# Patient Record
Sex: FEMALE | Race: WHITE | Employment: FULL TIME | URBAN - METROPOLITAN AREA
[De-identification: names, ages, dates, MRNs, and addresses within clinical notes are randomized per-mention and may not be internally consistent; named-entity substitution may affect disease eponyms.]

---

## 2020-07-08 ENCOUNTER — HOSPITAL ENCOUNTER (EMERGENCY)
Facility: CLINIC | Age: 25
Discharge: HOME | End: 2020-07-08
Attending: EMERGENCY MEDICINE | Admitting: EMERGENCY MEDICINE
Payer: COMMERCIAL

## 2020-07-08 ENCOUNTER — APPOINTMENT (OUTPATIENT)
Dept: CT IMAGING | Facility: CLINIC | Age: 25
End: 2020-07-08
Attending: EMERGENCY MEDICINE
Payer: COMMERCIAL

## 2020-07-08 VITALS
WEIGHT: 125 LBS | RESPIRATION RATE: 14 BRPM | HEART RATE: 75 BPM | OXYGEN SATURATION: 97 % | HEIGHT: 67 IN | BODY MASS INDEX: 19.62 KG/M2 | TEMPERATURE: 98.2 F | DIASTOLIC BLOOD PRESSURE: 81 MMHG | SYSTOLIC BLOOD PRESSURE: 122 MMHG

## 2020-07-08 DIAGNOSIS — Z78.9 ALCOHOL USE: ICD-10-CM

## 2020-07-08 DIAGNOSIS — Z32.01 PREGNANCY TEST POSITIVE: ICD-10-CM

## 2020-07-08 DIAGNOSIS — R56.9 SEIZURE (H): ICD-10-CM

## 2020-07-08 LAB
ALBUMIN SERPL-MCNC: 4.1 G/DL (ref 3.4–5)
ALP SERPL-CCNC: 68 U/L (ref 40–150)
ALT SERPL W P-5'-P-CCNC: 30 U/L (ref 0–50)
ANION GAP SERPL CALCULATED.3IONS-SCNC: 8 MMOL/L (ref 3–14)
AST SERPL W P-5'-P-CCNC: 25 U/L (ref 0–45)
B-HCG SERPL-ACNC: 30 IU/L (ref 0–5)
BASOPHILS # BLD AUTO: 0 10E9/L (ref 0–0.2)
BASOPHILS NFR BLD AUTO: 0.1 %
BILIRUB DIRECT SERPL-MCNC: 0.3 MG/DL (ref 0–0.2)
BILIRUB SERPL-MCNC: 1 MG/DL (ref 0.2–1.3)
BUN SERPL-MCNC: 12 MG/DL (ref 7–30)
CALCIUM SERPL-MCNC: 9.1 MG/DL (ref 8.5–10.1)
CHLORIDE SERPL-SCNC: 105 MMOL/L (ref 94–109)
CO2 SERPL-SCNC: 22 MMOL/L (ref 20–32)
CREAT SERPL-MCNC: 0.74 MG/DL (ref 0.52–1.04)
DIFFERENTIAL METHOD BLD: ABNORMAL
EOSINOPHIL # BLD AUTO: 0 10E9/L (ref 0–0.7)
EOSINOPHIL NFR BLD AUTO: 0.4 %
ERYTHROCYTE [DISTWIDTH] IN BLOOD BY AUTOMATED COUNT: 12.9 % (ref 10–15)
ETHANOL SERPL-MCNC: <0.01 G/DL
GFR SERPL CREATININE-BSD FRML MDRD: >90 ML/MIN/{1.73_M2}
GLUCOSE SERPL-MCNC: 79 MG/DL (ref 70–99)
HCG SERPL QL: POSITIVE
HCT VFR BLD AUTO: 41.4 % (ref 35–47)
HGB BLD-MCNC: 14.2 G/DL (ref 11.7–15.7)
IMM GRANULOCYTES # BLD: 0 10E9/L (ref 0–0.4)
IMM GRANULOCYTES NFR BLD: 0.1 %
INTERPRETATION ECG - MUSE: NORMAL
LIPASE SERPL-CCNC: 101 U/L (ref 73–393)
LYMPHOCYTES # BLD AUTO: 1.2 10E9/L (ref 0.8–5.3)
LYMPHOCYTES NFR BLD AUTO: 16.6 %
MAGNESIUM SERPL-MCNC: 2.1 MG/DL (ref 1.6–2.3)
MCH RBC QN AUTO: 33.3 PG (ref 26.5–33)
MCHC RBC AUTO-ENTMCNC: 34.3 G/DL (ref 31.5–36.5)
MCV RBC AUTO: 97 FL (ref 78–100)
MONOCYTES # BLD AUTO: 0.5 10E9/L (ref 0–1.3)
MONOCYTES NFR BLD AUTO: 7.2 %
NEUTROPHILS # BLD AUTO: 5.4 10E9/L (ref 1.6–8.3)
NEUTROPHILS NFR BLD AUTO: 75.6 %
NRBC # BLD AUTO: 0 10*3/UL
NRBC BLD AUTO-RTO: 0 /100
PHOSPHATE SERPL-MCNC: 1.6 MG/DL (ref 2.5–4.5)
PLATELET # BLD AUTO: 171 10E9/L (ref 150–450)
POTASSIUM SERPL-SCNC: 3.8 MMOL/L (ref 3.4–5.3)
PROT SERPL-MCNC: 7.2 G/DL (ref 6.8–8.8)
RBC # BLD AUTO: 4.27 10E12/L (ref 3.8–5.2)
SODIUM SERPL-SCNC: 135 MMOL/L (ref 133–144)
WBC # BLD AUTO: 7.1 10E9/L (ref 4–11)

## 2020-07-08 PROCEDURE — 84100 ASSAY OF PHOSPHORUS: CPT | Performed by: EMERGENCY MEDICINE

## 2020-07-08 PROCEDURE — 83690 ASSAY OF LIPASE: CPT | Performed by: EMERGENCY MEDICINE

## 2020-07-08 PROCEDURE — 83735 ASSAY OF MAGNESIUM: CPT | Performed by: EMERGENCY MEDICINE

## 2020-07-08 PROCEDURE — 25800030 ZZH RX IP 258 OP 636: Performed by: EMERGENCY MEDICINE

## 2020-07-08 PROCEDURE — 84702 CHORIONIC GONADOTROPIN TEST: CPT | Performed by: EMERGENCY MEDICINE

## 2020-07-08 PROCEDURE — 80048 BASIC METABOLIC PNL TOTAL CA: CPT | Performed by: EMERGENCY MEDICINE

## 2020-07-08 PROCEDURE — 93005 ELECTROCARDIOGRAM TRACING: CPT

## 2020-07-08 PROCEDURE — 84703 CHORIONIC GONADOTROPIN ASSAY: CPT | Performed by: EMERGENCY MEDICINE

## 2020-07-08 PROCEDURE — 25000132 ZZH RX MED GY IP 250 OP 250 PS 637: Performed by: EMERGENCY MEDICINE

## 2020-07-08 PROCEDURE — 96360 HYDRATION IV INFUSION INIT: CPT

## 2020-07-08 PROCEDURE — 85025 COMPLETE CBC W/AUTO DIFF WBC: CPT | Performed by: EMERGENCY MEDICINE

## 2020-07-08 PROCEDURE — 80320 DRUG SCREEN QUANTALCOHOLS: CPT | Performed by: EMERGENCY MEDICINE

## 2020-07-08 PROCEDURE — 99285 EMERGENCY DEPT VISIT HI MDM: CPT | Mod: 25

## 2020-07-08 PROCEDURE — 80076 HEPATIC FUNCTION PANEL: CPT | Performed by: EMERGENCY MEDICINE

## 2020-07-08 PROCEDURE — 96361 HYDRATE IV INFUSION ADD-ON: CPT

## 2020-07-08 PROCEDURE — 70450 CT HEAD/BRAIN W/O DYE: CPT

## 2020-07-08 RX ORDER — PNV NO.95/FERROUS FUM/FOLIC AC 28MG-0.8MG
1 TABLET ORAL DAILY
Qty: 90 TABLET | Refills: 3 | Status: SHIPPED | OUTPATIENT
Start: 2020-07-08

## 2020-07-08 RX ORDER — SODIUM PHOSPHATE, DIBASIC, ANHYDROUS, POTASSIUM PHOSPHATE, MONOBASIC, AND SODIUM PHOSPHATE, MONOBASIC, MONOHYDRATE 852; 155; 130 MG/1; MG/1; MG/1
1 TABLET, COATED ORAL 3 TIMES DAILY
Qty: 6 TABLET | Refills: 0 | Status: SHIPPED | OUTPATIENT
Start: 2020-07-08 | End: 2020-07-10

## 2020-07-08 RX ADMIN — SODIUM CHLORIDE 1000 ML: 9 INJECTION, SOLUTION INTRAVENOUS at 12:58

## 2020-07-08 RX ADMIN — POTASSIUM PHOSPHATE, MONOBASIC 1000 MG: 500 TABLET, SOLUBLE ORAL at 14:05

## 2020-07-08 ASSESSMENT — ENCOUNTER SYMPTOMS
FEVER: 0
WEAKNESS: 0
NUMBNESS: 0
NAUSEA: 0
NECK PAIN: 0
VOMITING: 0
DIZZINESS: 1
SEIZURES: 1
SHORTNESS OF BREATH: 0

## 2020-07-08 ASSESSMENT — MIFFLIN-ST. JEOR: SCORE: 1344.63

## 2020-07-08 NOTE — ED AVS SNAPSHOT
Emergency Department  [address] [facility] [address]  Phone:  [phone]  Fax:  [phone]                                    Lisha Mckeon   MRN: [id number]    Department:   Emergency Department   Date of Visit:  7/8/2020           After Visit Summary Signature Page    I have received my discharge instructions, and my questions have been answered. I have discussed any challenges I see with this plan with the nurse or doctor.    ..........................................................................................................................................  Patient/Patient Representative Signature      ..........................................................................................................................................  Patient Representative Print Name and Relationship to Patient    ..................................................               ................................................  Date                                   Time    ..........................................................................................................................................  Reviewed by Signature/Title    ...................................................              ..............................................  Date                                               Time          22EPIC Rev 08/18

## 2020-07-08 NOTE — ED NOTES
Bed: ED25  Expected date:   Expected time:   Means of arrival:   Comments:  [facility] - 432 - 24 F seizure eta 1212

## 2020-07-08 NOTE — ED PROVIDER NOTES
History     Chief Complaint: Seizures    HPI   Lisha Mckeon is a 24 year old female who presents via EMS following a seizure. She notes she had a headache before and after the episode but this has since resolved. She states she felt dizzy and a bit off before the seizure but feels fine now. She endorses drinking more alcohol than normal last night. She notes she is not on birth control, and her last menstrual period was June 13th. She denies recent illness, neck pain/stiffness, fever, chest pain, shortness of breath, nausea, vomiting, numbness, weakness, falls, or trauma. She denies family history of seizures or heart disease.     The patient's friend notes they were waiting in a parking lot for a restaurant to open, and she states they felt a bit hungover. She notes the patient was doing her eyebrows when she noticed the patient was leaning over, stiffened, and started shaking. She reports she noticed the patient's pupils were enlarged and she was drooling. She states she laid the seat back and called 911. The patient was unresponsive during this episode. She states the seizure resolved quickly. She notes the patient was confused afterwards but seems fine now.     Currently the patient reports she feels back to baseline. No numbness, tingling, or weakness.  She did bite her tongue. She denies any other substance use other than alcohol.    Allergies:  No known drug allergies    Medications:    The patient is not currently taking any prescribed medications.    Past Medical History:    The patient does not have any past pertinent medical history.    Past Surgical History:    History reviewed. No pertinent surgical history.    Family History:    History reviewed. No pertinent family history.    Social History:  Smoking status: Yes  Alcohol use: Yes  Drug use: No  PCP: Physician No Ref-Primary  Presents to the ED with friend Javier  Marital Status:  Single [1]    Review of Systems   Constitutional: Negative  "for fever.   Respiratory: Negative for shortness of breath.    Cardiovascular: Negative for chest pain.   Gastrointestinal: Negative for nausea and vomiting.   Musculoskeletal: Negative for neck pain.   Neurological: Positive for dizziness and seizures. Negative for weakness and numbness.   All other systems reviewed and are negative.      Physical Exam     Patient Vitals for the past 24 hrs:   BP Temp Temp src Pulse Heart Rate Resp SpO2 Height Weight   07/08/20 1400 128/74 -- -- 80 99 14 -- -- --   07/08/20 1330 (!) 142/74 -- -- 80 86 11 96 % -- --   07/08/20 1230 130/83 -- -- 94 97 21 98 % -- --   07/08/20 1223 135/86 98.2  F (36.8  C) Temporal 88 -- 16 99 % 1.702 m (5' 7\") 56.7 kg (125 lb)     Physical Exam  General: Well appearing, nontoxic. Resting comfortably  Head:  Scalp, face, and head appear normal, atraumatic  Eyes:  Pupils are equal, round, and reactive to light, EOMI, no nystagmus     Conjunctivae non-injected and sclerae white  ENT:    The external nose is normal    Pinnae are normal    The oropharynx is normal, mucous membranes moist. Mild abrasion to the left anterior tongue without swelling, laceration or bleeding. Uvula is in the midline. Posterior pharynx clear without swelling, exudates or erythema.  Neck:  Normal range of motion    There is no rigidity noted    Trachea is in the midline  CV:  Regular rate and rhythm     Normal S1/S2, no S3/S4    No murmur or rub. Radial pulses 2+ bilaterally  Resp:  Lungs are clear and equal bilaterally    There is no tachypnea    No increased work of breathing    No rales, wheezing, or rhonchi  GI:  Abdomen is soft, no rigidity or guarding    No distension, or mass    No tenderness or rebound tenderness   MS:  Normal muscular tone    Symmetric motor strength    No lower extremity edema  Skin:  No rash or acute skin lesions noted  Neuro: A&Ox3, GCS 15    CN II - XII intact    Speech clear, fluent, and normal    Strength 5/5 and symmetric in bilateral upper and " lower extremities.    No pronator drift. No leg drift. SILT throughout.    No ankle clonus    FTN testing normal. No tremor.     No meningismus   Psych:  Normal affect.  Appropriate interactions.      Emergency Department Course   ECG (12:32:10):  Rate 83 bpm. SC interval 130. QRS duration 84. QT/QTc 370/434. P-R-T axes 71 54 95. Normal sinus rhythm. Cannot rule out anterior infarct, age undetermined. ST & T wave abnormality, consider lateral ischemia. Abnormal EKG. Artifact present. Interpreted at 1301 by Edward Ansari MD.    Imaging:  Radiographic findings were communicated with the patient who voiced understanding of the findings.    Head CT w/o Contrast  No acute pathology is identified. No bleed, mass, or acute infarcts are identified on this noncontrast scan.   As read by Radiology.    Laboratory:  CBC: WNL (WBC 7.1, HGB 14.2, )  BMP: WNL (Creatinine 0.74)  Hepatic panel: Bilirubin direct 0.3 (H), o/w WNL  Alcohol ethyl: <0.01  Lipase: 101  Magnesium: 2.1  Phosphorus: 1.6 (L)  HCG Qualitative: Positive  HCG Quantitative: 30 (H)    Procedures: None.    Interventions:  1258 NS 1L IV Bolus  1405 K-Phos 1000 mg PO    Emergency Department Course:  Past medical records, nursing notes, and vitals reviewed.  1255: I performed an exam of the patient and obtained history, as documented above.    EKG performed, results above.    IV inserted and Blood drawn. This was sent to the lab for further testing, results above.    The patient was sent for a Head CT while in the emergency department, findings above.    1332: I rechecked the patient. Explained findings to patient and informed her she is pregnant.    1437: I spoke with Ultrasound and canceled the previous order for an OB US based on the patient's low HCG.    1511: I rechecked the patient. Findings and plan explained to the patient. Patient discharged home with instructions regarding supportive care, medications, and reasons to return. The importance of close  follow-up was reviewed.     Impression & Plan      Medical Decision Making:  This is a 24 year old female presenting after possible seizure.  ABCs were intact on presentation and there was no sign of trauma and no need for c-spine precautions based on history and presentation.  she has VS that are stable and appropriate and a physical exam that shows no acute abnormalities, of note there are no focal neurologic findings or changes to baseline mental status at this time.    This episode was associated with heavy alcohol consumption the night prior, poor sleep. No trauma. Workup in the ED is reassuring. CT head negative for ICH, or other acute intracranial process. Pregnancy test positive with quant hCG of 30. Patient has not yet missed a period and this pregnancy would be very early ~3 weeks based on LMP. She has no abdominal pain, vaginal bleeding, or tenderness to palpation to suggest ectopic pregnancy or other pregnancy related complication. No fever or other infectious signs or symptoms. Presentation not consistent with meningitis/encephalitis. Given the early nature of the pregnancy US is not indicated at this time. Glucose normal. ECG without dysrhythmia or ischemia. Electrolytes notable for low phos of 1.6. This is likely due to alcohol use and may have contributed to her seizure. Oral phos repletion was given and I would continue this for 2 more days as well as prenatal vitamin. I stressed the importance of close follow up with PCP and Neurology. No indication for hospitalization. Patient informed she may not drive until cleared by her PCP or neurologist. She will need to establish care with ob/gyn for pregnancy related care as well. I recommended she abstain from all drugs/alcohol/medications other than tylenol given her pregnancy. Return precautions were discussed with patient. The patient's questions were answered and the patient was agreeable with discharge.     Critical Care time:  none    Diagnosis:     ICD-10-CM    1. Seizure (H)  R56.9    2. Pregnancy test positive  Z32.01    3. Alcohol use  Z72.89      Disposition: discharged to home with K-Phos and Prenatal Vitamins    Discharge Medications:  New Prescriptions    K-PHOS-NEUTRAL [id number] MG TABLET    Take 1 tablet by mouth 3 times daily for 2 days    PRENATAL VIT-FE FUMARATE-FA (PRENATAL VITAMIN) 27-0.8 MG TABS    Take 1 tablet by mouth daily     IAlexis, am serving as a scribe at 1:01 PM on 7/8/2020 to document services personally performed by Edward Ansari MD based on my observations and the provider's statements to me.     Alexis Mcgee  7/8/2020    EMERGENCY DEPARTMENT       Edward Ansari MD  07/08/20 2219       Edward Ansari MD  07/08/20 2215

## 2020-07-08 NOTE — DISCHARGE INSTRUCTIONS
ACCORDING TO STATE LAW YOU MAY NOT DRIVE OR OPERATE A MOTOR VEHICLE UNTIL CLEARED BY YOUR DOCTOR OR NEUROLOGIST AFTER HAVING A SEIZURE.

## 2020-07-09 NOTE — PROGRESS NOTES
Subjective     Lisha Mckeon is a 24 year old female who presents to clinic today for the following health issues:    HPI     ED/UC Followup:    Facility:   Emergency Dept.  Date of visit: 7/8/2020  Reason for visit: seizures  Current Status: normal, good     Patient presents for follow up after seizure-like episode, which occurred the day after heavy alcohol consumption. Has not had any further seizure-like symptoms since initial event. While in the Emergency Room, was found to be pregnant at about 3 weeks gestation and has yet to miss last period. Does have some breast tenderness.      Reviewed and updated as needed this visit by Provider         Review of Systems   Constitutional, HEENT, cardiovascular, pulmonary, gi and gu systems are negative, except as otherwise noted.      Objective    /82   Pulse 76   Temp 98  F (36.7  C) (Oral)   Resp 15   Wt 58.2 kg (128 lb 6.4 oz)   LMP 06/13/2020 (Approximate)   SpO2 100%   BMI 20.11 kg/m    Body mass index is 20.11 kg/m .  Physical Exam   GENERAL: healthy, alert and no distress  RESP: lungs clear to auscultation - no rales, rhonchi or wheezes  CV: regular rate and rhythm, normal S1 S2, no S3 or S4, no murmur, click or rub, no peripheral edema and peripheral pulses strong  NEURO: Normal strength and tone, sensory exam grossly normal, mentation intact, speech normal, Romberg normal and rapid alternating movements normal  PSYCH: mentation appears normal, affect normal/bright    Diagnostic Test Results:  Labs reviewed in Epic  No results found for any visits on 07/10/20.        Assessment & Plan     1. Seizures (H)  Follow-up with Neurology as planned for further evaluation, no driving until ok'd by Neurology.    2. Pregnancy test positive  Patient unsure if she will keep pregnancy. Given options for prenatal care if she decides to pursue. Encouraged her to abstain from alcohol and tobacco and take her prenatal vitamin.       Tobacco Cessation:    reports that she has been smoking vaping device. She has never used smokeless tobacco.  Tobacco Cessation Action Plan: Self help information given to patient        See Patient Instructions    No follow-ups on file.    STANLEY Simon CNP  [facility]

## 2020-07-10 ENCOUNTER — OFFICE VISIT (OUTPATIENT)
Dept: FAMILY MEDICINE | Facility: CLINIC | Age: 25
End: 2020-07-10
Payer: COMMERCIAL

## 2020-07-10 VITALS
DIASTOLIC BLOOD PRESSURE: 82 MMHG | BODY MASS INDEX: 20.11 KG/M2 | HEART RATE: 76 BPM | RESPIRATION RATE: 15 BRPM | WEIGHT: 128.4 LBS | TEMPERATURE: 98 F | OXYGEN SATURATION: 100 % | SYSTOLIC BLOOD PRESSURE: 118 MMHG

## 2020-07-10 DIAGNOSIS — R56.9 SEIZURES (H): Primary | ICD-10-CM

## 2020-07-10 DIAGNOSIS — Z32.01 PREGNANCY TEST POSITIVE: ICD-10-CM

## 2020-07-10 PROCEDURE — 99203 OFFICE O/P NEW LOW 30 MIN: CPT | Performed by: NURSE PRACTITIONER
